# Patient Record
Sex: MALE | Race: BLACK OR AFRICAN AMERICAN | NOT HISPANIC OR LATINO | URBAN - METROPOLITAN AREA
[De-identification: names, ages, dates, MRNs, and addresses within clinical notes are randomized per-mention and may not be internally consistent; named-entity substitution may affect disease eponyms.]

---

## 2018-05-17 ENCOUNTER — APPOINTMENT (OUTPATIENT)
Dept: GENERAL RADIOLOGY | Facility: CLINIC | Age: 23
End: 2018-05-17
Attending: EMERGENCY MEDICINE

## 2018-05-17 ENCOUNTER — APPOINTMENT (OUTPATIENT)
Dept: CT IMAGING | Facility: CLINIC | Age: 23
End: 2018-05-17
Attending: EMERGENCY MEDICINE

## 2018-05-17 ENCOUNTER — HOSPITAL ENCOUNTER (EMERGENCY)
Facility: CLINIC | Age: 23
Discharge: HOME | End: 2018-05-17
Attending: EMERGENCY MEDICINE | Admitting: EMERGENCY MEDICINE

## 2018-05-17 VITALS
RESPIRATION RATE: 18 BRPM | SYSTOLIC BLOOD PRESSURE: 104 MMHG | DIASTOLIC BLOOD PRESSURE: 63 MMHG | OXYGEN SATURATION: 100 % | HEIGHT: 72 IN | TEMPERATURE: 98 F | HEART RATE: 110 BPM | WEIGHT: 124 LBS | BODY MASS INDEX: 16.8 KG/M2

## 2018-05-17 DIAGNOSIS — R56.9 SEIZURES (H): ICD-10-CM

## 2018-05-17 DIAGNOSIS — F41.9 ANXIETY: ICD-10-CM

## 2018-05-17 LAB
ALBUMIN SERPL-MCNC: 3.7 G/DL (ref 3.4–5)
ALBUMIN UR-MCNC: NEGATIVE MG/DL
ALP SERPL-CCNC: 73 U/L (ref 40–150)
ALT SERPL W P-5'-P-CCNC: 27 U/L (ref 0–70)
AMPHETAMINES UR QL SCN: NEGATIVE
ANION GAP SERPL CALCULATED.3IONS-SCNC: 7 MMOL/L (ref 3–14)
APPEARANCE CSF: CLEAR
APPEARANCE UR: CLEAR
AST SERPL W P-5'-P-CCNC: 23 U/L (ref 0–45)
BARBITURATES UR QL: NEGATIVE
BASOPHILS # BLD AUTO: 0.1 10E9/L (ref 0–0.2)
BASOPHILS NFR BLD AUTO: 1.1 %
BENZODIAZ UR QL: POSITIVE
BILIRUB SERPL-MCNC: 1.1 MG/DL (ref 0.2–1.3)
BILIRUB UR QL STRIP: NEGATIVE
BUN SERPL-MCNC: 13 MG/DL (ref 7–30)
CALCIUM SERPL-MCNC: 8.4 MG/DL (ref 8.5–10.1)
CANNABINOIDS UR QL SCN: POSITIVE
CHLORIDE SERPL-SCNC: 105 MMOL/L (ref 94–109)
CO2 SERPL-SCNC: 28 MMOL/L (ref 20–32)
COCAINE UR QL: NEGATIVE
COLOR CSF: COLORLESS
COLOR UR AUTO: YELLOW
CREAT SERPL-MCNC: 0.97 MG/DL (ref 0.66–1.25)
DIFFERENTIAL METHOD BLD: ABNORMAL
EOSINOPHIL # BLD AUTO: 0.2 10E9/L (ref 0–0.7)
EOSINOPHIL NFR BLD AUTO: 1.7 %
ERYTHROCYTE [DISTWIDTH] IN BLOOD BY AUTOMATED COUNT: 13.6 % (ref 10–15)
ETHANOL SERPL-MCNC: <0.01 G/DL
GFR SERPL CREATININE-BSD FRML MDRD: >90 ML/MIN/1.7M2
GLUCOSE CSF-MCNC: 65 MG/DL (ref 40–70)
GLUCOSE SERPL-MCNC: 82 MG/DL (ref 70–99)
GLUCOSE UR STRIP-MCNC: NEGATIVE MG/DL
GRAM STN SPEC: NORMAL
HCT VFR BLD AUTO: 46.4 % (ref 40–53)
HGB BLD-MCNC: 15.8 G/DL (ref 13.3–17.7)
HGB UR QL STRIP: NEGATIVE
IMM GRANULOCYTES # BLD: 0.1 10E9/L (ref 0–0.4)
IMM GRANULOCYTES NFR BLD: 0.4 %
KETONES UR STRIP-MCNC: NEGATIVE MG/DL
LEUKOCYTE ESTERASE UR QL STRIP: NEGATIVE
LYMPHOCYTES # BLD AUTO: 3.1 10E9/L (ref 0.8–5.3)
LYMPHOCYTES NFR BLD AUTO: 26.1 %
MCH RBC QN AUTO: 31.5 PG (ref 26.5–33)
MCHC RBC AUTO-ENTMCNC: 34.1 G/DL (ref 31.5–36.5)
MCV RBC AUTO: 93 FL (ref 78–100)
MONOCYTES # BLD AUTO: 1.1 10E9/L (ref 0–1.3)
MONOCYTES NFR BLD AUTO: 8.9 %
MUCOUS THREADS #/AREA URNS LPF: PRESENT /LPF
NEUTROPHILS # BLD AUTO: 7.4 10E9/L (ref 1.6–8.3)
NEUTROPHILS NFR BLD AUTO: 61.8 %
NITRATE UR QL: NEGATIVE
NRBC # BLD AUTO: 0 10*3/UL
NRBC BLD AUTO-RTO: 0 /100
OPIATES UR QL SCN: NEGATIVE
PCP UR QL SCN: NEGATIVE
PH UR STRIP: 6 PH (ref 5–7)
PLATELET # BLD AUTO: 365 10E9/L (ref 150–450)
POTASSIUM SERPL-SCNC: 3.4 MMOL/L (ref 3.4–5.3)
PROT CSF-MCNC: 33 MG/DL (ref 15–60)
PROT SERPL-MCNC: 7.3 G/DL (ref 6.8–8.8)
RBC # BLD AUTO: 5.01 10E12/L (ref 4.4–5.9)
RBC # CSF MANUAL: 0 /UL (ref 0–2)
RBC # CSF MANUAL: NORMAL /UL (ref 0–2)
RBC #/AREA URNS AUTO: <1 /HPF (ref 0–2)
SODIUM SERPL-SCNC: 140 MMOL/L (ref 133–144)
SOURCE: ABNORMAL
SP GR UR STRIP: 1.01 (ref 1–1.03)
SPECIMEN SOURCE: NORMAL
SQUAMOUS #/AREA URNS AUTO: <1 /HPF (ref 0–1)
TSH SERPL DL<=0.005 MIU/L-ACNC: 1.52 MU/L (ref 0.4–4)
TUBE # CSF: 4 #
UROBILINOGEN UR STRIP-MCNC: 2 MG/DL (ref 0–2)
WBC # BLD AUTO: 12 10E9/L (ref 4–11)
WBC # CSF MANUAL: 0 /UL (ref 0–5)
WBC # CSF MANUAL: NORMAL /UL (ref 0–5)
WBC #/AREA URNS AUTO: <1 /HPF (ref 0–5)

## 2018-05-17 PROCEDURE — 71046 X-RAY EXAM CHEST 2 VIEWS: CPT

## 2018-05-17 PROCEDURE — 80053 COMPREHEN METABOLIC PANEL: CPT | Performed by: EMERGENCY MEDICINE

## 2018-05-17 PROCEDURE — 87205 SMEAR GRAM STAIN: CPT | Performed by: EMERGENCY MEDICINE

## 2018-05-17 PROCEDURE — 87070 CULTURE OTHR SPECIMN AEROBIC: CPT | Performed by: EMERGENCY MEDICINE

## 2018-05-17 PROCEDURE — 80307 DRUG TEST PRSMV CHEM ANLYZR: CPT | Performed by: EMERGENCY MEDICINE

## 2018-05-17 PROCEDURE — 96374 THER/PROPH/DIAG INJ IV PUSH: CPT

## 2018-05-17 PROCEDURE — 62270 DX LMBR SPI PNXR: CPT

## 2018-05-17 PROCEDURE — 25000128 H RX IP 250 OP 636: Performed by: EMERGENCY MEDICINE

## 2018-05-17 PROCEDURE — 85025 COMPLETE CBC W/AUTO DIFF WBC: CPT | Performed by: EMERGENCY MEDICINE

## 2018-05-17 PROCEDURE — 84443 ASSAY THYROID STIM HORMONE: CPT | Performed by: EMERGENCY MEDICINE

## 2018-05-17 PROCEDURE — 70450 CT HEAD/BRAIN W/O DYE: CPT

## 2018-05-17 PROCEDURE — 80320 DRUG SCREEN QUANTALCOHOLS: CPT | Performed by: EMERGENCY MEDICINE

## 2018-05-17 PROCEDURE — 81001 URINALYSIS AUTO W/SCOPE: CPT | Performed by: EMERGENCY MEDICINE

## 2018-05-17 PROCEDURE — 96361 HYDRATE IV INFUSION ADD-ON: CPT

## 2018-05-17 PROCEDURE — 89050 BODY FLUID CELL COUNT: CPT | Performed by: EMERGENCY MEDICINE

## 2018-05-17 PROCEDURE — 99285 EMERGENCY DEPT VISIT HI MDM: CPT | Mod: 25

## 2018-05-17 PROCEDURE — 84157 ASSAY OF PROTEIN OTHER: CPT | Performed by: EMERGENCY MEDICINE

## 2018-05-17 PROCEDURE — 25000132 ZZH RX MED GY IP 250 OP 250 PS 637: Performed by: EMERGENCY MEDICINE

## 2018-05-17 PROCEDURE — 96375 TX/PRO/DX INJ NEW DRUG ADDON: CPT

## 2018-05-17 PROCEDURE — 82945 GLUCOSE OTHER FLUID: CPT | Performed by: EMERGENCY MEDICINE

## 2018-05-17 RX ORDER — KETOROLAC TROMETHAMINE 15 MG/ML
15 INJECTION, SOLUTION INTRAMUSCULAR; INTRAVENOUS ONCE
Status: COMPLETED | OUTPATIENT
Start: 2018-05-17 | End: 2018-05-17

## 2018-05-17 RX ORDER — LEVETIRACETAM 500 MG/1
500 TABLET ORAL 2 TIMES DAILY
Qty: 60 TABLET | Refills: 0 | Status: SHIPPED | OUTPATIENT
Start: 2018-05-17

## 2018-05-17 RX ORDER — SODIUM CHLORIDE 9 MG/ML
1000 INJECTION, SOLUTION INTRAVENOUS CONTINUOUS
Status: DISCONTINUED | OUTPATIENT
Start: 2018-05-17 | End: 2018-05-17 | Stop reason: HOSPADM

## 2018-05-17 RX ORDER — METOCLOPRAMIDE HYDROCHLORIDE 5 MG/ML
10 INJECTION INTRAMUSCULAR; INTRAVENOUS ONCE
Status: COMPLETED | OUTPATIENT
Start: 2018-05-17 | End: 2018-05-17

## 2018-05-17 RX ORDER — LEVETIRACETAM 500 MG/1
500 TABLET ORAL ONCE
Status: COMPLETED | OUTPATIENT
Start: 2018-05-17 | End: 2018-05-17

## 2018-05-17 RX ORDER — DIPHENHYDRAMINE HYDROCHLORIDE 50 MG/ML
25 INJECTION INTRAMUSCULAR; INTRAVENOUS ONCE
Status: COMPLETED | OUTPATIENT
Start: 2018-05-17 | End: 2018-05-17

## 2018-05-17 RX ORDER — LORAZEPAM 2 MG/ML
1 INJECTION INTRAMUSCULAR ONCE
Status: COMPLETED | OUTPATIENT
Start: 2018-05-17 | End: 2018-05-17

## 2018-05-17 RX ORDER — LORAZEPAM 1 MG/1
1 TABLET ORAL EVERY 8 HOURS PRN
Qty: 20 TABLET | Refills: 0 | Status: SHIPPED | OUTPATIENT
Start: 2018-05-17

## 2018-05-17 RX ORDER — BUPIVACAINE HYDROCHLORIDE 5 MG/ML
INJECTION, SOLUTION PERINEURAL
Status: DISCONTINUED
Start: 2018-05-17 | End: 2018-05-17 | Stop reason: HOSPADM

## 2018-05-17 RX ADMIN — KETOROLAC TROMETHAMINE 15 MG: 15 INJECTION, SOLUTION INTRAMUSCULAR; INTRAVENOUS at 01:39

## 2018-05-17 RX ADMIN — LEVETIRACETAM 500 MG: 500 TABLET ORAL at 04:37

## 2018-05-17 RX ADMIN — METOCLOPRAMIDE 10 MG: 5 INJECTION, SOLUTION INTRAMUSCULAR; INTRAVENOUS at 01:43

## 2018-05-17 RX ADMIN — DIPHENHYDRAMINE HYDROCHLORIDE 25 MG: 50 INJECTION, SOLUTION INTRAMUSCULAR; INTRAVENOUS at 01:41

## 2018-05-17 RX ADMIN — SODIUM CHLORIDE 1000 ML: 9 INJECTION, SOLUTION INTRAVENOUS at 01:39

## 2018-05-17 RX ADMIN — LORAZEPAM 1 MG: 2 INJECTION INTRAMUSCULAR; INTRAVENOUS at 03:57

## 2018-05-17 ASSESSMENT — ENCOUNTER SYMPTOMS
NAUSEA: 1
BACK PAIN: 1
SEIZURES: 1
NERVOUS/ANXIOUS: 1
SLEEP DISTURBANCE: 1
DIAPHORESIS: 1
HEADACHES: 1

## 2018-05-17 NOTE — ED TRIAGE NOTES
Pt might of had seizure today at about 1 pm   Was found on the floor was complete body shaking   Legs arms stiff  Gasping for air,  This lasted about 2 min   Pt has not slept for 2 days  Nauseated   Pt has had couple of episodes where he stated that he recalls blacking out    Also after seizure like activity was sweating    Pt has hx of anxiety   Mind has been thinking about stuff  Denies wanting to hurt himself    Pt c/o ha left side now alert and oriented   Abc intact

## 2018-05-17 NOTE — ED PROVIDER NOTES
"  History     Chief Complaint:  Alleged seizure and anxiety    HPI   Anamika Segundo is a 22 year old male with a history of anxiety who presents to the emergency department today for evaluation of multiple complaints including alleged seizure this afternoon and anxiety. The patient's brother reports he had an alleged seizure this afternoon around 1300 that last two minutes. He was found on the floor in the shower with complete body shaking, leg stiffness, neck stretched out, and gasping for air. The patient does not remember this seizure episode. He was diaphoretic immediately after the alleged seizure with five minutes of confusion. He notes that he hasn't slept in two days and hasn't eaten and drank in two days. He has experienced a couple episodes of loss of consciousness in the past, but has never experienced a seizure. Later in the day around 1400, he started to feel new onset nausea and felt like he lost consciousness again with a possible second seizure, but this was unwitnessed and he is unsure how long this lasted. Also, when he was helping his brother move two weeks ago, he hit his tailbone on the wall causing lower back pain. Additionally, he notes that he has been increasingly anxious and his mind has been \"thinking about stuff\" lately especially when trying to sleep. He used to be on alprazolam twice daily for his anxiety that was prescribed by his primary doctor when he was living in [place]. He hasn't taken this medication for three weeks because he was supposed to only stay for a week, but has been here for a month to meet new friends and family. But, he has taken four doses of his sister in law's alprazolam in the last three weeks that he thinks is different from his usual prescription with his last dose today. Additionally, he admits to using marijuana recently for his anxiety. He was concerned about the seizure earlier today and the nausea prompting his visit to the emergency department. At arrival, " he notes he has a left sided headache since the seizure and lower back pain different since the seizures. He denies suicidal ideation, vomiting, chest pain, dysuria, and recent alcohol use    Allergies:  No Known Drug Allergies    Medications:    The patient is currently on no regular medications.    Past Medical History:    Anxiety  Anemia    Past Surgical History:    History reviewed. No pertinent past surgical history.    Family History:    History reviewed. No pertinent family history.     Social History:  The patient was accompanied to the ED by brother.  Smoking Status: Current Every Day Smoker  Smokeless Tobacco: Never  Marital Status:  Single     Review of Systems   Constitutional: Positive for diaphoresis.   Cardiovascular: Negative for chest pain.   Gastrointestinal: Positive for nausea.   Musculoskeletal: Positive for back pain.   Neurological: Positive for seizures, syncope and headaches.   Psychiatric/Behavioral: Positive for sleep disturbance. Negative for suicidal ideas. The patient is nervous/anxious.    All other systems reviewed and are negative.    Physical Exam     Patient Vitals for the past 24 hrs:   BP Temp Temp src Pulse Heart Rate Resp SpO2 Height Weight   05/17/18 0357 - - - - 77 20 100 % - -   05/17/18 0326 100/69 - - - 70 22 99 % - -   05/17/18 0230 (!) 86/54 - - - 80 18 100 % - -   05/17/18 0215 - - - - - - 99 % - -   05/17/18 0145 - - - - 89 - 98 % - -   05/17/18 0130 107/57 - - - - - 97 % - -   05/17/18 0115 - - - - - - 99 % - -   05/17/18 0100 101/55 - - - - - 99 % - -   05/17/18 0024 110/64 98  F (36.7  C) Oral 110 - 16 96 % 1.829 m (6') 56.2 kg (124 lb)         Physical Exam  Constitutional:  Appears well-developed and well-nourished. Alert. Conversant. Non toxic.  HENT:   Head: Atraumatic. No depressed skull fracture, Racoon Eyes, Ang's sign, or hemotympanum. Face normal. TMs normal  Nose: Nose normal.  Mouth/Throat: Oral mucosa is clear and moist. no trismus. Pharynx normal.  Tonsils symmetric. No tonsillar enlargement, erythema, or exudate.  Eyes: Conjunctivae normal. EOM normal. Pupils equal, round, and reactive to light. No scleral icterus.   Neck: Normal range of motion. Neck supple. No tracheal deviation present.   Cardiovascular: Normal rate, regular rhythm. No gallop. No friction rub. No murmur heard. Symmetric radial artery pulses   Pulmonary/Chest: Effort normal. No stridor. No respiratory distress. No wheezes. No rales. No rhonchi . No tenderness.   Abdominal: Soft. Bowel sounds normal. No distension. No mass. No tenderness. No rebound. No guarding.   Musculoskeletal:   RUE: Normal range of motion. No tenderness. No deformity  LUE: Normal range of motion. No tenderness. No deformity  RLE: Normal range of motion. No edema. No tenderness. No deformity  LLE: Normal range of motion. No edema. No tenderness. No deformity  Lymph: No cervical adenopathy.   Neurological: Alert and oriented to person, place, and time. Cranial Nerves intact II-XII except I did not formally test gag or visual acuity.  EOMI. Strength 5/5 bilaterally in the trapezius, deltoid, biceps, triceps, , thumb opposition, finger abduction, psoas, quadriceps, hamstring, gastrocnemius, tibialis anterior. Sensation intact to light touch in all 4 extremities (C4-T1 and L4-S1). coordination normal. Mental status normal. Attention normal. GCS 15. Memory normal. Speech fluent. Cognition normal. Gait normal.   Skin: Skin is warm and dry. No rash noted. No pallor. Normal capillary refill.  Psychiatric: Pleasant.  Accompanied by his brother who seems supportive and is also pleasant.  They seem surprisingly unalarmed by his seizure today.  Patient endorses a history of anxiety, with trouble controlling his anxiety lately.  He says he has not slept much in the past couple of nights.  No specific new stressors other than multiple life stressors including recent death of a friend, trouble with his mother who is a single mom,  other family problems.  He apparently has a PCP in his home state of [place] who has been prescribing medications for him in the past, but is been here in [place] for the past month but does not have any doctors here.  He has been out of all of his medications for the past 3 weeks.  No suicidal ideation.  He admits to marijuana use to help control his anxiety but denies any other drug or alcohol use.      Emergency Department Course   Imaging:  Radiology findings were communicated with the patient and family who voiced understanding of the findings.  XR Chest 2 views  IMPRESSION: No infiltrates or other acute findings. Heart size is  within normal limits.  Report per radiology     CT Head w/o Contrast  IMPRESSION: No acute intracranial findings.   Report per radiology     Laboratory:  Laboratory findings were communicated with the patient and family who voiced understanding of the findings.  CBC: WBC 12.0 (H) o/w WNL. (HGB 15.8, )   CMP: calcium 8.4 (L) o/w WNL (Creatinine 0.97)  Alcohol ethyl: <0.01  TSH with free T4 reflex: 1.52  Drug abuse screen 77 urine: benzodiazepine positive, cannabinoids positive o/w Negative  UA: clear, yellow urine, mucous present o/w WNL  CSF Cell Count & Differential: WNL  CSF Glucose: 65  CSF Protein: 33  CSF Gram Stain & Culture: Pending    Procedures:  [facility] Procedure Note          Lumbar Puncture:      Time: 0300 AM  Performed by: Brandyn Choi MD  Authorized by: Brandyn Choi MD    Indications: headache and seizures    Consent given by: Patient who states understanding of the procedure being performed after discussing the risks, benefits and alternatives.    Prior to the start of the procedure and with procedural staff participation, I verbally confirmed the patient s identity using two indicators, relevant allergies, that the procedure was appropriate and matched the consent or emergent situation, and that the correct equipment/implants were available.  Immediately prior to starting the procedure I conducted the Time Out with the procedural staff and re-confirmed the patient s name, procedure, and site/side. (The Joint Commission universal protocol was followed.) Yes    Under sterile conditions the patient was positioned Sitting, bent forward. Betadine solution and sterile drapes were utilized.  Local anesthetic at the site: 5 ml of bupivacaine 0.5% without epinephrine from the LP tray  A 22 G 3.5 inch cutting tip spinal needle was inserted at the L 3-4 interspace.  Opening Pressure was not checked.  A total of 5mL of clear and colorless spinal fluid was obtained and sent to the laboratory.   After the needle was removed, a bandaid and pressure were applied and the patient was instructed to stay horizontal until the results were back.    Complications:  None    Patient tolerance: Patient tolerated the procedure well with no immediate complications.      Interventions:  0139 NS Bolus 1,000mL IV  0139 Toradol 15 mg IV  0141 Benadryl 25 mg IV  0143 Reglan 10 mg IV  0357 Ativan 1 mg IV  0437 Keppra 500 mg PO    Emergency Department Course:  Nursing notes and vitals reviewed.  IV was inserted and blood was drawn for laboratory testing, results above.  The patient provided a urine sample here in the emergency department. This was sent for laboratory testing, findings above.  The patient was sent for a XR Chest 2 views and CT Head w/o Contrast while in the emergency department, results above.   0108: I performed an exam of the patient as documented above.   0240: Patient rechecked and updated.   0300: I performed a lumbar puncture as noted above.   0400: Patient rechecked and updated.   0419: I spoke with Dr. Duvall of the neurology service regarding patient's presentation, findings, and plan of care.  0428: Patient rechecked and updated.   Findings and plan explained to the Patient and brother. Patient discharged home with instructions regarding supportive care,  medications, and reasons to return. The importance of close follow-up was reviewed. The patient was prescribed Keppra and Ativan.   I personally reviewed the laboratory and imaging results with the Patient and brother and answered all related questions prior to discharge.    Impression & Plan    Medical Decision Making:  Anamika Segundo is a 22 year old male brought to the ER tonight by his brother.  They are concerned because he had at least 2 events today where he blacked out.  His brother witnessed 1 of them and gives a fairly eloquent description of a generalized tonic-clonic seizure that lasted a minute or 2 followed by a period of postictal confusion.  Patient has no prior history of seizure disorder.      We undertook an evaluation to look for causes of seizure.  He was back to his normal mental status here.  No evidence for hypoglycemia or electrolyte disturbance.  Head CT negative for any obvious hemorrhage or mass lesion.  Patient was complaining of a headache since his seizure, also with some neck stiffness, so I felt lumbar puncture was indicated to look for possible meningeal encephalitis.  Fortunately cell counts are normal in his CSF.  No clear infectious or electrolyte cause.  Patient has a history of anxiety, and had been prescribed Xanax by his doctors in [place].  He came here to [place] to visit family about a month ago, and has stayed much longer that he originally planned.  He has been out of his prescribed Xanax for the past 3 weeks or so.  He has been taking it sporadically, out of the supply that his sister-in-law has.  He apparently took 2 Xanax tablets last week and then 2 Xanax tablets yesterday.  His brother corroborates this pattern of intake.  With that only sporadic use, I do not think that we can reliably conclude his seizures were due to benzodiazepine withdrawal.  He denies any history of alcohol consumption and alcohol levels negative.  He is not tremulous, tachycardic, diaphoretic  or showing other signs of withdrawal.  He does admit to marijuana use, confirmed on his drug screen, but otherwise no recreational drugs.      He has also been having some insomnia for the past few nights due to his anxiety, apparently stemming from multiple life stressors including the recent death of his friend,, problems with his mother and family.  He endorses a long history of anxiety but really is not being treated for it currently.  He has no providers here in [place].  I offered him a chance to speak to counselors about this but he is declining.  Overall I think he is meeting his activities of daily living, not actively posing a risk to himself or others, so cannot be placed on hold.    In terms of the seizures we have not identified any specific organic cause.  They may be primary epilepsy.  I recommended admission for observation, given the unclear cause and the potential for anxiety or some other surreptitious drug use or withdrawal not reported by the patient.  Patient did not want to stay in the hospital.  I do not think he can be held against his will.  I discussed the case with our on-call neurologist, Dr. Kate.  He recommended that we start the patient on antiepileptics, Keppra, and that the patient can follow-up outpatient with neurology clinic.  I discussed this plan of care with the patient and that is what he wants to do, despite my recommendation for admission.  He is counseled about the risks of seizures, importance of avoiding dangerous activities.  No driving.  No swimming.  No heights.  Return if any concerns or recurrent seizures develop otherwise follow-up outpatient with neurology.          Diagnosis:    ICD-10-CM    1. Seizures (H) R56.9    2. Anxiety F41.9        Disposition:  discharged to home    Discharge Medications:  New Prescriptions    LEVETIRACETAM (KEPPRA) 500 MG TABLET    Take 1 tablet (500 mg) by mouth 2 times daily    LORAZEPAM (ATIVAN) 1 MG TABLET    Take 1 tablet (1 mg)  by mouth every 8 hours as needed for anxiety Take 30 minutes prior to departure.  Do not operate a vehicle after taking this medication         Scribe Disclosure:  I, Maged Le, am serving as a scribe at 12:55 AM on 5/17/2018 to document services personally performed by Brandyn Choi MD based on my observations and the provider's statements to me.     5/17/2018   [facility] EMERGENCY DEPARTMENT       Brandyn Choi MD  05/17/18 9037

## 2018-05-17 NOTE — ED NOTES
Per lab, preliminary result from lumbar puncture negative for any organism. Specimen sent to [facility]

## 2018-05-17 NOTE — ED NOTES
Pt very anxious, wanting to leave, go out and smoke. I mg lorazepam given.pt verbalised understanding that he cant go out smoke and come back.

## 2018-05-17 NOTE — ED AVS SNAPSHOT
[facility] Emergency Department    [address]    [facility] [address]    Phone:  [phone]    Fax:  [phone]                                       Anamika Segundo   MRN: [id number]    Department:  [facility] Emergency Department   Date of Visit:  5/17/2018           After Visit Summary Signature Page     I have received my discharge instructions, and my questions have been answered. I have discussed any challenges I see with this plan with the nurse or doctor.    ..........................................................................................................................................  Patient/Patient Representative Signature      ..........................................................................................................................................  Patient Representative Print Name and Relationship to Patient    ..................................................               ................................................  Date                                            Time    ..........................................................................................................................................  Reviewed by Signature/Title    ...................................................              ..............................................  Date                                                            Time

## 2018-05-17 NOTE — ED NOTES
Of note: Pt has been taking alprazolam almost every day for two years. Pt from [place] and only brought a couple pills because he didn't think he would be visiting her long so he ran out and hasn't been taking his alprazolam.

## 2018-05-17 NOTE — ED AVS SNAPSHOT
[facility] Emergency Department    [address]    [facility] [address]    Phone:  [phone]    Fax:  [phone]                                       Anamika Segundo   MRN: [id number]    Department:  [facility] Emergency Department   Date of Visit:  5/17/2018           Patient Information     Date Of Birth          1995        Your diagnoses for this visit were:     Seizures (H)     Anxiety        You were seen by Brandyn Choi MD.      Follow-up Information     Follow up with Jadiel Kate MD In 5 days.    Specialty:  Neurology    Contact information:    [facility]  [address]  [facility] [address]  [phone]        Discharge References/Attachments     SEIZURE, NEW ONSET, UNKNOWN CAUSE (ADULT) (ENGLISH)    ANXIETY DISORDERS, UNDERSTANDING (ENGLISH)      24 Hour Appointment Hotline       To make an appointment at any [facility], call [phone] ([phone]). If you don't have a family doctor or clinic, we will help you find one. [facility] clinics are conveniently located to serve the needs of you and your family.             Review of your medicines      START taking        Dose / Directions Last dose taken    levETIRAcetam 500 MG tablet   Commonly known as:  KEPPRA   Dose:  500 mg   Quantity:  60 tablet        Take 1 tablet (500 mg) by mouth 2 times daily   Refills:  0        LORazepam 1 MG tablet   Commonly known as:  ATIVAN   Dose:  1 mg   Quantity:  20 tablet        Take 1 tablet (1 mg) by mouth every 8 hours as needed for anxiety Take 30 minutes prior to departure.  Do not operate a vehicle after taking this medication   Refills:  0          Our records show that you are taking the medicines listed below. If these are incorrect, please call your family doctor or clinic.        Dose / Directions Last dose taken    predniSONE 20 MG tablet   Commonly known as:  DELTASONE   Dose:  20 mg   Quantity:  8 tablet         Take 1 tablet (20 mg) by mouth 2 times daily   Refills:  0                Prescriptions were sent or printed at these locations (2 Prescriptions)                   Other Prescriptions                Printed at Department/Unit printer (2 of 2)         levETIRAcetam (KEPPRA) 500 MG tablet               LORazepam (ATIVAN) 1 MG tablet                Procedures and tests performed during your visit     Procedure/Test Number of Times Performed    Alcohol ethyl 1    Basic metabolic panel 1    CBC with platelets differential 1    CSF Culture Aerobic Bacterial 1    CT Head w/o Contrast 1    Cardiac Continuous Monitoring 1    Cell count with differential CSF: Tube 4 2    Comprehensive Panel Addition 1    Drug abuse screen 77 urine (FL, RH, SH) 1    Glucose CSF: Tube 2 1    Gram stain 1    Peripheral IV catheter 1    Protein total CSF: Tube 2 1    TSH with free T4 reflex 1    UA with Microscopic 1    XR Chest 2 Views 1      Orders Needing Specimen Collection     None      Pending Results     Date and Time Order Name Status Description    5/17/2018 0231 Gram stain Preliminary     5/17/2018 0231 CSF Culture Aerobic Bacterial In process             Pending Culture Results     Date and Time Order Name Status Description    5/17/2018 0231 Gram stain Preliminary     5/17/2018 0231 CSF Culture Aerobic Bacterial In process             Pending Results Instructions     If you had any lab results that were not finalized at the time of your Discharge, you can call the ED Lab Result RN at [phone]. You will be contacted by this team for any positive Lab results or changes in treatment. The nurses are available 7 days a week from 10A to 6:30P.  You can leave a message 24 hours per day and they will return your call.        Test Results From Your Hospital Stay        5/17/2018  1:13 AM      Component Results     Component Value Ref Range & Units Status    WBC 12.0 (H) 4.0 - 11.0 10e9/L Final    RBC Count 5.01 4.4 - 5.9 10e12/L  Final    Hemoglobin 15.8 13.3 - 17.7 g/dL Final    Hematocrit 46.4 40.0 - 53.0 % Final    MCV 93 78 - 100 fl Final    MCH 31.5 26.5 - 33.0 pg Final    MCHC 34.1 31.5 - 36.5 g/dL Final    RDW 13.6 10.0 - 15.0 % Final    Platelet Count 365 150 - 450 10e9/L Final    Diff Method Automated Method  Final    % Neutrophils 61.8 % Final    % Lymphocytes 26.1 % Final    % Monocytes 8.9 % Final    % Eosinophils 1.7 % Final    % Basophils 1.1 % Final    % Immature Granulocytes 0.4 % Final    Nucleated RBCs 0 0 /100 Final    Absolute Neutrophil 7.4 1.6 - 8.3 10e9/L Final    Absolute Lymphocytes 3.1 0.8 - 5.3 10e9/L Final    Absolute Monocytes 1.1 0.0 - 1.3 10e9/L Final    Absolute Eosinophils 0.2 0.0 - 0.7 10e9/L Final    Absolute Basophils 0.1 0.0 - 0.2 10e9/L Final    Abs Immature Granulocytes 0.1 0 - 0.4 10e9/L Final    Absolute Nucleated RBC 0.0  Final         5/17/2018  2:07 AM      Component Results     Component Value Ref Range & Units Status    Sodium 140 133 - 144 mmol/L Final    Potassium 3.4 3.4 - 5.3 mmol/L Final    Chloride 105 94 - 109 mmol/L Final    Carbon Dioxide 28 20 - 32 mmol/L Final    Anion Gap 7 3 - 14 mmol/L Final    Glucose 82 70 - 99 mg/dL Final    Urea Nitrogen 13 7 - 30 mg/dL Final    Creatinine 0.97 0.66 - 1.25 mg/dL Final    GFR Estimate >90 >60 mL/min/1.7m2 Final    Non  GFR Calc    GFR Estimate If Black >90 >60 mL/min/1.7m2 Final    African American GFR Calc    Calcium 8.4 (L) 8.5 - 10.1 mg/dL Final         5/17/2018  2:13 AM      Narrative     XR CHEST 2 VW   5/17/2018 2:05 AM     INDICATION: Seizure.    COMPARISON: None.        Impression     IMPRESSION: No infiltrates or other acute findings. Heart size is  within normal limits.    KELSEY MONTOYA MD         5/17/2018  2:16 AM      Component Results     Component Value Ref Range & Units Status    Color Urine Yellow  Final    Appearance Urine Clear  Final    Glucose Urine Negative NEG^Negative mg/dL Final    Bilirubin Urine  Negative NEG^Negative Final    Ketones Urine Negative NEG^Negative mg/dL Final    Specific Gravity Urine 1.012 1.003 - 1.035 Final    Blood Urine Negative NEG^Negative Final    pH Urine 6.0 5.0 - 7.0 pH Final    Protein Albumin Urine Negative NEG^Negative mg/dL Final    Urobilinogen mg/dL 2.0 0.0 - 2.0 mg/dL Final    Nitrite Urine Negative NEG^Negative Final    Leukocyte Esterase Urine Negative NEG^Negative Final    Source Midstream Urine  Final    WBC Urine <1 0 - 5 /HPF Final    RBC Urine <1 0 - 2 /HPF Final    Squamous Epithelial /HPF Urine <1 0 - 1 /HPF Final    Mucous Urine Present (A) NEG^Negative /LPF Final         5/17/2018  2:18 AM      Component Results     Component Value Ref Range & Units Status    Amphetamine Qual Urine Negative NEG^Negative Final    Cutoff for a negative amphetamine is 500 ng/mL or less.    Barbiturates Qual Urine Negative NEG^Negative Final    Cutoff for a negative barbiturate is 200 ng/mL or less.    Benzodiazepine Qual Urine Positive (A) NEG^Negative Final    Cutoff for a positive benzodiazepine is greater than 200 ng/mL. This is an   unconfirmed screening result to be used for medical purposes only.      Cannabinoids Qual Urine Positive (A) NEG^Negative Final    Cutoff for a positive cannabinoid is greater than 50 ng/mL. This is an   unconfirmed screening result to be used for medical purposes only.      Cocaine Qual Urine Negative NEG^Negative Final    Cutoff for a negative cocaine is 300 ng/mL or less.    Opiates Qualitative Urine Negative NEG^Negative Final    Cutoff for a negative opiate is 300 ng/mL or less.    PCP Qual Urine Negative NEG^Negative Final    Cutoff for a negative PCP is 25 ng/mL or less.         5/17/2018  2:04 AM      Narrative     CT HEAD W/O CONTRAST  5/17/2018 1:59 AM     HISTORY: Headache. New onset of seizure today.    TECHNIQUE:  Axial images of the head and coronal reformations without  IV contrast material. Radiation dose for this scan was reduced  using  automated exposure control, adjustment of the mA and/or kV according  to patient size, or iterative reconstruction technique.    COMPARISON: None    FINDINGS: No intracranial hemorrhage, mass or mass effect.  No acute  infarct identified. No shift of midline structures. The ventricles are  symmetric. No skull fractures.        Impression     IMPRESSION: No acute intracranial findings.    KELSEY MONTOYA MD         5/17/2018  2:07 AM      Component Results     Component Value Ref Range & Units Status    Bilirubin Total 1.1 0.2 - 1.3 mg/dL Final    ALT 27 0 - 70 U/L Final    AST 23 0 - 45 U/L Final    Protein Total 7.3 6.8 - 8.8 g/dL Final    Alkaline Phosphatase 73 40 - 150 U/L Final    Albumin 3.7 3.4 - 5.0 g/dL Final         5/17/2018  2:07 AM      Component Results     Component Value Ref Range & Units Status    Ethanol g/dL <0.01 <0.01 g/dL Final         5/17/2018  2:31 AM      Component Results     Component Value Ref Range & Units Status    TSH 1.52 0.40 - 4.00 mU/L Final         5/17/2018  4:02 AM      Component Results     Component Value Ref Range & Units Status    WBC CSF 0 0 - 5 /uL Final    RBC CSF 0 0 - 2 /uL Final    Tube Number 4 # Final    Color CSF Colorless CLRL^Colorless Final    Appearance CSF Clear CLER^Clear Final         5/17/2018  3:57 AM      Component Results     Component Value Ref Range & Units Status    Glucose CSF 65 40 - 70 mg/dL Final    CSF glucose concentrations are about 60 percent of normal plasma glucose.         5/17/2018  3:57 AM      Component Results     Component Value Ref Range & Units Status    Protein Total CSF 33 15 - 60 mg/dL Final         5/17/2018  3:32 AM         5/17/2018  4:27 AM      Component Results     Component    Specimen Description    Cerebrospinal fluid    Gram Stain    No organisms seen    Gram Stain    Gram stain result is preliminary and awaits review of Microbiology Staff.         5/17/2018  3:58 AM      Component Results     Component Value  Ref Range & Units Status    WBC CSF  0 - 5 /uL Final    Test not performed. Criteria not met for second cell count.    RBC CSF  0 - 2 /uL Final    Test not performed. Criteria not met for second cell count.                Clinical Quality Measure: Blood Pressure Screening     Your blood pressure was checked while you were in the emergency department today. The last reading we obtained was  BP: 100/69 . Please read the guidelines below about what these numbers mean and what you should do about them.  If your systolic blood pressure (the top number) is less than 120 and your diastolic blood pressure (the bottom number) is less than 80, then your blood pressure is normal. There is nothing more that you need to do about it.  If your systolic blood pressure (the top number) is 120-139 or your diastolic blood pressure (the bottom number) is 80-89, your blood pressure may be higher than it should be. You should have your blood pressure rechecked within a year by a primary care provider.  If your systolic blood pressure (the top number) is 140 or greater or your diastolic blood pressure (the bottom number) is 90 or greater, you may have high blood pressure. High blood pressure is treatable, but if left untreated over time it can put you at risk for heart attack, stroke, or kidney failure. You should have your blood pressure rechecked by a primary care provider within the next 4 weeks.  If your provider in the emergency department today gave you specific instructions to follow-up with your doctor or provider even sooner than that, you should follow that instruction and not wait for up to 4 weeks for your follow-up visit.        Thank you for choosing [facility]       Thank you for choosing [facility] for your care. Our goal is always to provide you with excellent care. Hearing back from our patients is one way we can continue to improve our services. Please take a few minutes to complete the written survey that you may receive  "in the mail after you visit with us. Thank you!        [employer] Information     [email or web address] lets you send messages to your doctor, view your test results, renew your prescriptions, schedule appointments and more. To sign up, go to [email or web address] . Click on \"Log in\" on the left side of the screen, which will take you to the Welcome page. Then click on \"Sign up Now\" on the right side of the page.     You will be asked to enter the access code listed below, as well as some personal information. Please follow the directions to create your username and password.     Your access code is: [email or web address]  Expires: 8/15/2018  4:32 AM     Your access code will  in 90 days. If you need help or a new code, please call your [facility] clinic or [phone].        Care EveryWhere ID     This is your Care EveryWhere ID. This could be used by other organizations to access your [facility] medical records  [id number]        Equal Access to Services     EMILE ALLISON : Hadii cindy abbotto Solucho, waaxda luqadaha, qaybta kaalmada adedouglas, gina ayala. So [facility] [phone].    ATENCIÓN: Si habla español, tiene a buckley disposición servicios gratuitos de asistencia lingüística. Llame al [phone].    We comply with applicable federal civil rights laws and [place] laws. We do not discriminate on the basis of race, color, national origin, age, disability, sex, sexual orientation, or gender identity.            After Visit Summary       This is your record. Keep this with you and show to your community pharmacist(s) and doctor(s) at your next visit.                  "

## 2018-05-22 LAB
BACTERIA SPEC CULT: NO GROWTH
SPECIMEN SOURCE: NORMAL

## 2023-04-19 ENCOUNTER — HOSPITAL ENCOUNTER (EMERGENCY)
Facility: CLINIC | Age: 28
Discharge: HOME | End: 2023-04-19

## 2023-04-19 ENCOUNTER — HOSPITAL ENCOUNTER (EMERGENCY)
Facility: CLINIC | Age: 28
End: 2023-04-19

## 2023-06-17 ENCOUNTER — HOSPITAL ENCOUNTER (EMERGENCY)
Facility: CLINIC | Age: 28
Discharge: HOME | End: 2023-06-17
Attending: EMERGENCY MEDICINE | Admitting: EMERGENCY MEDICINE

## 2023-06-17 VITALS
SYSTOLIC BLOOD PRESSURE: 126 MMHG | TEMPERATURE: 97.1 F | OXYGEN SATURATION: 100 % | RESPIRATION RATE: 16 BRPM | HEART RATE: 85 BPM | DIASTOLIC BLOOD PRESSURE: 73 MMHG

## 2023-06-17 DIAGNOSIS — F10.920 ALCOHOLIC INTOXICATION WITHOUT COMPLICATION (H): ICD-10-CM

## 2023-06-17 LAB
ALBUMIN SERPL BCG-MCNC: 4.3 G/DL (ref 3.5–5.2)
ALP SERPL-CCNC: 75 U/L (ref 40–129)
ALT SERPL W P-5'-P-CCNC: 10 U/L (ref 0–70)
ANION GAP SERPL CALCULATED.3IONS-SCNC: 17 MMOL/L (ref 7–15)
AST SERPL W P-5'-P-CCNC: 28 U/L (ref 0–45)
BASOPHILS # BLD AUTO: 0.1 10E3/UL (ref 0–0.2)
BASOPHILS NFR BLD AUTO: 0 %
BILIRUB SERPL-MCNC: 1.2 MG/DL
BUN SERPL-MCNC: 10.1 MG/DL (ref 6–20)
CALCIUM SERPL-MCNC: 9.8 MG/DL (ref 8.6–10)
CHLORIDE SERPL-SCNC: 102 MMOL/L (ref 98–107)
CREAT SERPL-MCNC: 0.94 MG/DL (ref 0.67–1.17)
DEPRECATED HCO3 PLAS-SCNC: 20 MMOL/L (ref 22–29)
EOSINOPHIL # BLD AUTO: 0.5 10E3/UL (ref 0–0.7)
EOSINOPHIL NFR BLD AUTO: 4 %
ERYTHROCYTE [DISTWIDTH] IN BLOOD BY AUTOMATED COUNT: 13.9 % (ref 10–15)
ETHANOL SERPL-MCNC: <0.01 G/DL
GFR SERPL CREATININE-BSD FRML MDRD: >90 ML/MIN/1.73M2
GLUCOSE SERPL-MCNC: 79 MG/DL (ref 70–99)
HCT VFR BLD AUTO: 45.8 % (ref 40–53)
HGB BLD-MCNC: 14.8 G/DL (ref 13.3–17.7)
IMM GRANULOCYTES # BLD: 0.1 10E3/UL
IMM GRANULOCYTES NFR BLD: 0 %
INR PPP: 1.18 (ref 0.85–1.15)
LIPASE SERPL-CCNC: 18 U/L (ref 13–60)
LYMPHOCYTES # BLD AUTO: 1.1 10E3/UL (ref 0.8–5.3)
LYMPHOCYTES NFR BLD AUTO: 8 %
MCH RBC QN AUTO: 30.1 PG (ref 26.5–33)
MCHC RBC AUTO-ENTMCNC: 32.3 G/DL (ref 31.5–36.5)
MCV RBC AUTO: 93 FL (ref 78–100)
MONOCYTES # BLD AUTO: 0.6 10E3/UL (ref 0–1.3)
MONOCYTES NFR BLD AUTO: 5 %
NEUTROPHILS # BLD AUTO: 11.2 10E3/UL (ref 1.6–8.3)
NEUTROPHILS NFR BLD AUTO: 83 %
NRBC # BLD AUTO: 0 10E3/UL
NRBC BLD AUTO-RTO: 0 /100
PLATELET # BLD AUTO: 264 10E3/UL (ref 150–450)
POTASSIUM SERPL-SCNC: 4.4 MMOL/L (ref 3.4–5.3)
PROT SERPL-MCNC: 7 G/DL (ref 6.4–8.3)
RBC # BLD AUTO: 4.91 10E6/UL (ref 4.4–5.9)
SODIUM SERPL-SCNC: 139 MMOL/L (ref 136–145)
WBC # BLD AUTO: 13.6 10E3/UL (ref 4–11)

## 2023-06-17 PROCEDURE — 99284 EMERGENCY DEPT VISIT MOD MDM: CPT | Mod: 25 | Performed by: EMERGENCY MEDICINE

## 2023-06-17 PROCEDURE — 36415 COLL VENOUS BLD VENIPUNCTURE: CPT | Performed by: EMERGENCY MEDICINE

## 2023-06-17 PROCEDURE — 999N000128 HC STATISTIC PERIPHERAL IV START W/O US GUIDANCE

## 2023-06-17 PROCEDURE — 96374 THER/PROPH/DIAG INJ IV PUSH: CPT | Performed by: EMERGENCY MEDICINE

## 2023-06-17 PROCEDURE — 250N000011 HC RX IP 250 OP 636: Performed by: EMERGENCY MEDICINE

## 2023-06-17 PROCEDURE — C9113 INJ PANTOPRAZOLE SODIUM, VIA: HCPCS | Performed by: EMERGENCY MEDICINE

## 2023-06-17 PROCEDURE — 85025 COMPLETE CBC W/AUTO DIFF WBC: CPT | Performed by: EMERGENCY MEDICINE

## 2023-06-17 PROCEDURE — 99284 EMERGENCY DEPT VISIT MOD MDM: CPT | Performed by: EMERGENCY MEDICINE

## 2023-06-17 PROCEDURE — 258N000003 HC RX IP 258 OP 636: Performed by: EMERGENCY MEDICINE

## 2023-06-17 PROCEDURE — 80053 COMPREHEN METABOLIC PANEL: CPT | Performed by: EMERGENCY MEDICINE

## 2023-06-17 PROCEDURE — 96375 TX/PRO/DX INJ NEW DRUG ADDON: CPT | Performed by: EMERGENCY MEDICINE

## 2023-06-17 PROCEDURE — 82077 ASSAY SPEC XCP UR&BREATH IA: CPT | Performed by: EMERGENCY MEDICINE

## 2023-06-17 PROCEDURE — 96361 HYDRATE IV INFUSION ADD-ON: CPT | Performed by: EMERGENCY MEDICINE

## 2023-06-17 PROCEDURE — 85610 PROTHROMBIN TIME: CPT | Performed by: EMERGENCY MEDICINE

## 2023-06-17 PROCEDURE — 83690 ASSAY OF LIPASE: CPT | Performed by: EMERGENCY MEDICINE

## 2023-06-17 RX ORDER — LORAZEPAM 2 MG/ML
1 INJECTION INTRAMUSCULAR ONCE
Status: COMPLETED | OUTPATIENT
Start: 2023-06-17 | End: 2023-06-17

## 2023-06-17 RX ORDER — ONDANSETRON 2 MG/ML
4 INJECTION INTRAMUSCULAR; INTRAVENOUS
Status: DISCONTINUED | OUTPATIENT
Start: 2023-06-17 | End: 2023-06-17 | Stop reason: HOSPADM

## 2023-06-17 RX ORDER — SODIUM CHLORIDE, SODIUM LACTATE, POTASSIUM CHLORIDE, CALCIUM CHLORIDE 600; 310; 30; 20 MG/100ML; MG/100ML; MG/100ML; MG/100ML
1000 INJECTION, SOLUTION INTRAVENOUS CONTINUOUS
Status: DISCONTINUED | OUTPATIENT
Start: 2023-06-17 | End: 2023-06-17 | Stop reason: HOSPADM

## 2023-06-17 RX ORDER — ONDANSETRON 2 MG/ML
4 INJECTION INTRAMUSCULAR; INTRAVENOUS ONCE
Status: COMPLETED | OUTPATIENT
Start: 2023-06-17 | End: 2023-06-17

## 2023-06-17 RX ADMIN — ONDANSETRON 4 MG: 2 INJECTION INTRAMUSCULAR; INTRAVENOUS at 12:53

## 2023-06-17 RX ADMIN — LORAZEPAM 1 MG: 2 INJECTION INTRAMUSCULAR; INTRAVENOUS at 12:55

## 2023-06-17 RX ADMIN — SODIUM CHLORIDE, POTASSIUM CHLORIDE, SODIUM LACTATE AND CALCIUM CHLORIDE 1000 ML: 600; 310; 30; 20 INJECTION, SOLUTION INTRAVENOUS at 14:00

## 2023-06-17 RX ADMIN — PANTOPRAZOLE SODIUM 40 MG: 40 INJECTION, POWDER, FOR SOLUTION INTRAVENOUS at 12:56

## 2023-06-17 RX ADMIN — SODIUM CHLORIDE, POTASSIUM CHLORIDE, SODIUM LACTATE AND CALCIUM CHLORIDE 1000 ML: 600; 310; 30; 20 INJECTION, SOLUTION INTRAVENOUS at 12:31

## 2023-06-17 ASSESSMENT — ACTIVITIES OF DAILY LIVING (ADL)
ADLS_ACUITY_SCORE: 35

## 2023-06-17 NOTE — ED PROVIDER NOTES
ED Provider Note  [facility]      History     Chief Complaint   Patient presents with     Alcohol Intoxication     The history is provided by the patient and medical records.     Sanya Willson is a 28 year old male who reports a long history of polysubstance abuse, primarily alcohol, and comes in today for nausea and vomiting.  States that about 6 AM is when the emesis began. He has vomited up to 9 times. It's been nonbloody. He has not noticed any melanotic bowel movements.  He does have abdominal discomfort in the epigastrium. He states that he drinks approximately 1 L of cognac per day, his last drink was at 4 AM this morning.  In addition he uses a number of other substances. 2 weeks ago he completed a 1 month course of Suboxone and that is the last time he has taken any opiate medications.  In addition at 10 PM last night he took mushrooms and marijuana and sometime last night took a half of a Xanax.  He states that he has not used Xanax regularly his in the past month, that is the only the dose last night was the only dose that he has taken.      The patient has recently moved to [facility] from [place]. He does have housing, currently staying with his girlfriend.      This part of the medical record was transcribed by Sara Regalado, Medical Scribe, from a dictation done by Yissel Almanza MD.     Past Medical History  History reviewed. No pertinent past medical history.  No past surgical history on file.  No current outpatient medications on file.    No Known Allergies  Family History  No family history on file.  Social History          A medically appropriate review of systems was performed with pertinent positives and negatives noted in the HPI, and all other systems negative.    Physical Exam   BP: (!) 128/92  Pulse: 92  Temp: 97.1  F (36.2  C)  Resp: 16  SpO2: 99 %     Physical Exam  Gen:A&Ox3, uncomfortable.   HEENT:PERRL, no facial tenderness or wounds, head atraumatic,  oropharynx clear, mucous membranes moist  Neck:no bony tenderness or step offs, no JVD, trachea midline, no crepitus  Back: no CVA tenderness, no midline bony tenderness  CV: RRR without murmurs  PULM:Clear to auscultation bilaterally  Abd:soft, tender in the epigastrium and RUQ with negative degroot's sign.   : no wounds or infection  UE:No traumatic injuries, skin normal  LE:no traumatic injuries, skin normal, no LE edema.   Neuro:CN II-XII intact, strength 5/5 throughout  Skin: no rashes or ecchymoses      ED Course, Procedures, & Data      Procedures       Results for orders placed or performed during the hospital encounter of 06/17/23   Alcohol     Status: Normal   Result Value Ref Range    Alcohol ethyl <0.01 <=0.01 g/dL   Comprehensive metabolic panel     Status: Abnormal   Result Value Ref Range    Sodium 139 136 - 145 mmol/L    Potassium 4.4 3.4 - 5.3 mmol/L    Chloride 102 98 - 107 mmol/L    Carbon Dioxide (CO2) 20 (L) 22 - 29 mmol/L    Anion Gap 17 (H) 7 - 15 mmol/L    Urea Nitrogen 10.1 6.0 - 20.0 mg/dL    Creatinine 0.94 0.67 - 1.17 mg/dL    Calcium 9.8 8.6 - 10.0 mg/dL    Glucose 79 70 - 99 mg/dL    Alkaline Phosphatase 75 40 - 129 U/L    AST 28 0 - 45 U/L    ALT 10 0 - 70 U/L    Protein Total 7.0 6.4 - 8.3 g/dL    Albumin 4.3 3.5 - 5.2 g/dL    Bilirubin Total 1.2 <=1.2 mg/dL    GFR Estimate >90 >60 mL/min/1.73m2   INR     Status: Abnormal   Result Value Ref Range    INR 1.18 (H) 0.85 - 1.15   Lipase     Status: Normal   Result Value Ref Range    Lipase 18 13 - 60 U/L   CBC with platelets and differential     Status: Abnormal   Result Value Ref Range    WBC Count 13.6 (H) 4.0 - 11.0 10e3/uL    RBC Count 4.91 4.40 - 5.90 10e6/uL    Hemoglobin 14.8 13.3 - 17.7 g/dL    Hematocrit 45.8 40.0 - 53.0 %    MCV 93 78 - 100 fL    MCH 30.1 26.5 - 33.0 pg    MCHC 32.3 31.5 - 36.5 g/dL    RDW 13.9 10.0 - 15.0 %    Platelet Count 264 150 - 450 10e3/uL    % Neutrophils 83 %    % Lymphocytes 8 %    % Monocytes 5 %     % Eosinophils 4 %    % Basophils 0 %    % Immature Granulocytes 0 %    NRBCs per 100 WBC 0 <1 /100    Absolute Neutrophils 11.2 (H) 1.6 - 8.3 10e3/uL    Absolute Lymphocytes 1.1 0.8 - 5.3 10e3/uL    Absolute Monocytes 0.6 0.0 - 1.3 10e3/uL    Absolute Eosinophils 0.5 0.0 - 0.7 10e3/uL    Absolute Basophils 0.1 0.0 - 0.2 10e3/uL    Absolute Immature Granulocytes 0.1 <=0.4 10e3/uL    Absolute NRBCs 0.0 10e3/uL   CBC with platelets differential     Status: Abnormal    Narrative    The following orders were created for panel order CBC with platelets differential.  Procedure                               Abnormality         Status                     ---------                               -----------         ------                     CBC with platelets and d...[[id number]]  Abnormal            Final result                 Please view results for these tests on the individual orders.     Medications   lactated ringers BOLUS 1,000 mL (0 mLs Intravenous Stopped 6/17/23 1359)   ondansetron (ZOFRAN) injection 4 mg (4 mg Intravenous $Given 6/17/23 1253)   LORazepam (ATIVAN) injection 1 mg (1 mg Intravenous $Given 6/17/23 1255)   pantoprazole (PROTONIX) IV push injection 40 mg (40 mg Intravenous $Given 6/17/23 1256)     Labs Ordered and Resulted from Time of ED Arrival to Time of ED Departure   COMPREHENSIVE METABOLIC PANEL - Abnormal       Result Value    Sodium 139      Potassium 4.4      Chloride 102      Carbon Dioxide (CO2) 20 (*)     Anion Gap 17 (*)     Urea Nitrogen 10.1      Creatinine 0.94      Calcium 9.8      Glucose 79      Alkaline Phosphatase 75      AST 28      ALT 10      Protein Total 7.0      Albumin 4.3      Bilirubin Total 1.2      GFR Estimate >90     INR - Abnormal    INR 1.18 (*)    CBC WITH PLATELETS AND DIFFERENTIAL - Abnormal    WBC Count 13.6 (*)     RBC Count 4.91      Hemoglobin 14.8      Hematocrit 45.8      MCV 93      MCH 30.1      MCHC 32.3      RDW 13.9      Platelet Count 264      %  Neutrophils 83      % Lymphocytes 8      % Monocytes 5      % Eosinophils 4      % Basophils 0      % Immature Granulocytes 0      NRBCs per 100 WBC 0      Absolute Neutrophils 11.2 (*)     Absolute Lymphocytes 1.1      Absolute Monocytes 0.6      Absolute Eosinophils 0.5      Absolute Basophils 0.1      Absolute Immature Granulocytes 0.1      Absolute NRBCs 0.0     ETHYL ALCOHOL LEVEL - Normal    Alcohol ethyl <0.01     LIPASE - Normal    Lipase 18       No orders to display          Critical care was not performed.     Medical Decision Making  The patient's presentation was of high complexity (an acute health issue posing potential threat to life or bodily function).    The patient's evaluation involved:  review of external note(s) from 1 sources (Most recent ED visit in [place])  ordering and/or review of 3+ test(s) in this encounter (see separate area of note for details)    The patient's management necessitated high risk (a parenteral controlled substance).      Assessment & Plan    28-year-old male presenting with polysubstance abuse (primarily alcohol abuse) with nausea and vomiting. He arrives afebrile and hemodynamically stable.      Differential diagnosis includes pancreatitis, alcoholic hepatitis, alcoholic gastritis.  His abdominal exam was without focal tenderness except at the epigastrium and no signs of peritonitis. No historical features of GI bleeding.  Alcohol withdrawal is also possible.     IV access was obtained and laboratory testing done included CBC, comprehensive metabolic panel, lipase, and an INR. Urine drug screen was ordered as well as an alcohol level.    He was treated for symptoms with lactated Ringer's, IV Protonix, Zofran, and Ativan.      He is not interested in inpatient alcohol detox.  I will give him information about the recovery clinic and other community CD resources.     3pm  Repeat abdominal exam done.   Has minimal epigastric tenderness and no RUQ tenderness. Is improving  with the treatment above.   Awaiting lab results. Anticipating discharge if continues to feel better and can tolerate PO if labs reassuring.   Signed out to Dr. Doe.     This part of the medical record was transcribed by Sara Regalado, Medical Scribe, from a dictation done by Yissel Almanza MD.     I have reviewed the nursing notes. I have reviewed the findings, diagnosis, plan and need for follow up with the patient.    There are no discharge medications for this patient.      Final diagnoses:   Alcoholic intoxication without complication (H)       Yissel Almanza MD   [facility] EMERGENCY DEPARTMENT  6/17/2023     Yissel Almanza MD  06/18/23 1032

## 2023-06-17 NOTE — ED TRIAGE NOTES
From home for etoh withdrawal   Drinks 'a lot.' Last drink last night  Has had withdrawal seizures before  Reports he is jerking more today and nauseated  Also took mushrooms, xanax, marijuana yesterday       Triage Assessment     Row Name 06/17/23 1143       Triage Assessment (Adult)    Airway WDL WDL       Respiratory WDL    Respiratory WDL WDL       Skin Circulation/Temperature WDL    Skin Circulation/Temperature WDL WDL       Cardiac WDL    Cardiac WDL WDL       Peripheral/Neurovascular WDL    Peripheral Neurovascular WDL WDL       Cognitive/Neuro/Behavioral WDL    Cognitive/Neuro/Behavioral WDL WDL